# Patient Record
Sex: FEMALE | Race: WHITE | URBAN - NONMETROPOLITAN AREA
[De-identification: names, ages, dates, MRNs, and addresses within clinical notes are randomized per-mention and may not be internally consistent; named-entity substitution may affect disease eponyms.]

---

## 2021-05-05 ENCOUNTER — WEB ENCOUNTER (OUTPATIENT)
Dept: URBAN - NONMETROPOLITAN AREA CLINIC 4 | Facility: CLINIC | Age: 31
End: 2021-05-05

## 2021-05-05 ENCOUNTER — LAB OUTSIDE AN ENCOUNTER (OUTPATIENT)
Dept: URBAN - NONMETROPOLITAN AREA CLINIC 4 | Facility: CLINIC | Age: 31
End: 2021-05-05

## 2021-05-05 ENCOUNTER — OFFICE VISIT (OUTPATIENT)
Dept: URBAN - NONMETROPOLITAN AREA CLINIC 4 | Facility: CLINIC | Age: 31
End: 2021-05-05
Payer: COMMERCIAL

## 2021-05-05 VITALS
DIASTOLIC BLOOD PRESSURE: 60 MMHG | SYSTOLIC BLOOD PRESSURE: 115 MMHG | HEART RATE: 83 BPM | HEIGHT: 62 IN | TEMPERATURE: 98.1 F | BODY MASS INDEX: 36.1 KG/M2 | WEIGHT: 196.2 LBS

## 2021-05-05 DIAGNOSIS — R19.4 BOWEL HABIT CHANGES: ICD-10-CM

## 2021-05-05 DIAGNOSIS — R10.11 RUQ ABDOMINAL PAIN: ICD-10-CM

## 2021-05-05 DIAGNOSIS — Z90.49 STATUS POST LAPAROSCOPIC CHOLECYSTECTOMY: ICD-10-CM

## 2021-05-05 DIAGNOSIS — K58.2 IRRITABLE BOWEL SYNDROME WITH BOTH CONSTIPATION AND DIARRHEA: ICD-10-CM

## 2021-05-05 DIAGNOSIS — K21.9 GASTROESOPHAGEAL REFLUX DISEASE, UNSPECIFIED WHETHER ESOPHAGITIS PRESENT: ICD-10-CM

## 2021-05-05 DIAGNOSIS — R12 HEARTBURN: ICD-10-CM

## 2021-05-05 PROBLEM — 428882003: Status: ACTIVE | Noted: 2021-05-05

## 2021-05-05 PROBLEM — 10743008: Status: ACTIVE | Noted: 2021-05-05

## 2021-05-05 PROCEDURE — 99204 OFFICE O/P NEW MOD 45 MIN: CPT | Performed by: INTERNAL MEDICINE

## 2021-05-05 RX ORDER — DICYCLOMINE HYDROCHLORIDE 20 MG/1
1 TABLET TABLET ORAL
Qty: 120 TABLET | Refills: 1 | OUTPATIENT
Start: 2021-05-05 | End: 2021-07-04

## 2021-05-05 RX ORDER — OMEPRAZOLE 40 MG/1
1 CAPSULE 30 MINUTES BEFORE MORNING MEAL CAPSULE, DELAYED RELEASE ORAL ONCE A DAY
Qty: 30 | Refills: 1 | OUTPATIENT
Start: 2021-05-05

## 2021-05-05 NOTE — PHYSICAL EXAM GASTROINTESTINAL
Abdomen , soft, TTP in RUQ/epigastrium, nondistended , no guarding or rigidity , no masses palpable , normal bowel sounds , Liver and Spleen , no hepatomegaly present , no hepatosplenomegaly , liver nontender , spleen not palpable

## 2021-05-05 NOTE — HPI-TODAY'S VISIT:
5/5/21: Pt is a 29 yo female with PMH of symptomatic cholelithiasis s/p cholecystectomy on 4/5 who was referred by Dr. Mayer for evaluation of IBS.  Pt reports RUQ pain for past 3 months. Pain is constant and stabbing/aching in nature. No aggravating factors. Pain radiates into back and into chest. Reports occasional heartburn and reflux. THC and CBD gummies help. She takes a pre and probiotic. She has been intermittent fasting. Reports changes in bowel habits. Alternates between diarrhea and constipation. Has always had bowel issues since child. Notices mucous in stools occasionally. Denies any  hematochezia or melena. No FHx of IBD.

## 2021-05-06 LAB
C-REACTIVE PROTEIN, QUANT: <1
SEDIMENTATION RATE-WESTERGREN: 4

## 2021-05-14 ENCOUNTER — TELEPHONE ENCOUNTER (OUTPATIENT)
Dept: URBAN - METROPOLITAN AREA CLINIC 92 | Facility: CLINIC | Age: 31
End: 2021-05-14

## 2021-06-04 ENCOUNTER — OFFICE VISIT (OUTPATIENT)
Dept: URBAN - METROPOLITAN AREA SURGERY CENTER 14 | Facility: SURGERY CENTER | Age: 31
End: 2021-06-04

## 2021-06-18 ENCOUNTER — OFFICE VISIT (OUTPATIENT)
Dept: URBAN - NONMETROPOLITAN AREA CLINIC 4 | Facility: CLINIC | Age: 31
End: 2021-06-18

## 2022-09-22 ENCOUNTER — OFFICE VISIT (OUTPATIENT)
Dept: URBAN - METROPOLITAN AREA CLINIC 78 | Facility: CLINIC | Age: 32
End: 2022-09-22
Payer: COMMERCIAL

## 2022-09-22 ENCOUNTER — DASHBOARD ENCOUNTERS (OUTPATIENT)
Age: 32
End: 2022-09-22

## 2022-09-22 VITALS
RESPIRATION RATE: 16 BRPM | DIASTOLIC BLOOD PRESSURE: 76 MMHG | BODY MASS INDEX: 36.58 KG/M2 | WEIGHT: 198.8 LBS | HEART RATE: 99 BPM | TEMPERATURE: 98.1 F | HEIGHT: 62 IN | SYSTOLIC BLOOD PRESSURE: 118 MMHG

## 2022-09-22 DIAGNOSIS — R14.0 BLOATING: ICD-10-CM

## 2022-09-22 DIAGNOSIS — Z83.71 FAMILY HISTORY OF COLONIC POLYPS: ICD-10-CM

## 2022-09-22 DIAGNOSIS — Z80.0 FAMILY HISTORY OF COLON CANCER: ICD-10-CM

## 2022-09-22 DIAGNOSIS — K62.5 RECTAL BLEEDING: ICD-10-CM

## 2022-09-22 DIAGNOSIS — K59.01 CONSTIPATION: ICD-10-CM

## 2022-09-22 DIAGNOSIS — R12 HEARTBURN: ICD-10-CM

## 2022-09-22 DIAGNOSIS — K64.8 HEMORRHOIDS WITH COMPLICATION: ICD-10-CM

## 2022-09-22 DIAGNOSIS — R10.11 RUQ PAIN: ICD-10-CM

## 2022-09-22 PROBLEM — 14760008 CONSTIPATION: Status: ACTIVE | Noted: 2022-09-22

## 2022-09-22 PROCEDURE — 99214 OFFICE O/P EST MOD 30 MIN: CPT | Performed by: INTERNAL MEDICINE

## 2022-09-22 RX ORDER — SODIUM, POTASSIUM,MAG SULFATES 17.5-3.13G
354 ML SOLUTION, RECONSTITUTED, ORAL ORAL
Qty: 1 | Refills: 0 | OUTPATIENT
Start: 2022-09-22 | End: 2022-09-23

## 2022-09-22 RX ORDER — OMEPRAZOLE 40 MG/1
1 CAPSULE 30 MINUTES BEFORE MORNING MEAL CAPSULE, DELAYED RELEASE ORAL ONCE A DAY
Qty: 30 | Refills: 1 | Status: ON HOLD | COMMUNITY
Start: 2021-05-05

## 2022-09-22 NOTE — HPI-TODAY'S VISIT:
The patient was referred to us by Dr. Mayer for evaluation of hemorrhoids. A copy of this note will be sent to the referring physician.   The patient had been followed by Mignon Dougherty in 2021 for RUQ pain, constant and stabbing/aching in nature. The abdominal pain has since resolved completely.  She has had digestion issues all her life. She constantly alternates between constipation and diarrhea. Of the two, the constipation is more predominant. She feels bloated all the time. She can easily go 3 days without a BM. As a matter of fact, during her pregnancy she went 28 days without having a BM.  She states that she avoids dairy as these worsen her constipation.  She has tried using Mg, fish oil, Colace and probiotics which didn't help at all. Dulcolax caused severe cramping. She does not like to use Miralax as a cousin of hers developed neurologic issues from using high doses of Miralax.  She has noticed blood in the stools about once a week. This has been an ongoing issue for months.  No matter what she eats she is constipated. Despite going on fasting diets she continues to have constipation.  The patient has never had a colonoscopy previously. There is a FH of colon cancer in her GF diagnosed in his 70's,both her father and mother have had colon polyps.  She is terrified about the colonoscopy as she is concerned about the gas and it causing pain post-procedure.  She also reports extremely large hemorrhoids.    She denies anorexia or unintentional weight loss.  Regarding any upper GI complaints, the patient has not had nausea, vomiting or dysphagia.  She is having sporadic  heartburn.  The patient does not take blood thinners.  They deny any CP or VICKERS.  She has a history of symptomatic cholelithiasis s/p cholecystectomy in 2021.   She has an 8 month old baby and 2 other children.

## 2022-10-24 ENCOUNTER — TELEPHONE ENCOUNTER (OUTPATIENT)
Dept: URBAN - METROPOLITAN AREA CLINIC 78 | Facility: CLINIC | Age: 32
End: 2022-10-24

## 2022-10-25 ENCOUNTER — OFFICE VISIT (OUTPATIENT)
Dept: URBAN - METROPOLITAN AREA MEDICAL CENTER 10 | Facility: MEDICAL CENTER | Age: 32
End: 2022-10-25
Payer: COMMERCIAL

## 2022-10-25 DIAGNOSIS — K62.5 ANAL BLEEDING: ICD-10-CM

## 2022-10-25 DIAGNOSIS — K64.0 BLEEDING GRADE I HEMORRHOIDS: ICD-10-CM

## 2022-10-25 DIAGNOSIS — K59.09 CHANGE IN BOWEL MOVEMENTS INTERMITTENT CONSTIPATION. URGENCY IN THE MORNING.: ICD-10-CM

## 2022-10-25 PROCEDURE — 45378 DIAGNOSTIC COLONOSCOPY: CPT | Performed by: INTERNAL MEDICINE

## 2022-10-25 RX ORDER — OMEPRAZOLE 40 MG/1
1 CAPSULE 30 MINUTES BEFORE MORNING MEAL CAPSULE, DELAYED RELEASE ORAL ONCE A DAY
Qty: 30 | Refills: 1 | Status: ON HOLD | COMMUNITY
Start: 2021-05-05